# Patient Record
Sex: FEMALE
[De-identification: names, ages, dates, MRNs, and addresses within clinical notes are randomized per-mention and may not be internally consistent; named-entity substitution may affect disease eponyms.]

---

## 2021-04-11 ENCOUNTER — NURSE TRIAGE (OUTPATIENT)
Dept: OTHER | Facility: CLINIC | Age: 41
End: 2021-04-11

## 2021-04-11 NOTE — TELEPHONE ENCOUNTER
Reason for Disposition   General information question, no triage required and triager able to answer question    Answer Assessment - Initial Assessment Questions  1. REASON FOR CALL or QUESTION: \"What is your reason for calling today? \" or \"How can I best help you? \" or \"What question do you have that I can help answer? \"      Pt called to inquire as to if her  should return to work while waiting on Covid test result (she tested positive last week, and he is symptomatic with diarrhea currently). Protocols used: INFORMATION ONLY CALL - NO TRIAGE-ADULT-    Brief description of triage: No triage. Information only. Pt given advice per the CDC (CDC.gov/covid19):    Isolate If You Are Sick  Separate yourself from others if you have COVID-19    Updated Feb. 18, 2021  Print  Isolation is used to separate people infected with COVID-19 from those who are not infected. People who are in isolation should stay home until its safe for them to be around others. At home, anyone sick or infected should separate from others, stay in a specific sick room or area, and use a separate bathroom (if available). Isolation or Quarantine: What's the difference? Lauren Sutherland keeps someone who might have been exposed to the virus away from others. Isolation keeps someone who is infected with the virus away from others, even in their home. Who needs to isolate? People who have COVID-19    People who have symptoms of COVID-19 and are able to recover at home  People who dont have symptoms but have tested positive for COVID-19?? Steps to take    Stay home except to get medical care    Monitor your symptoms.  If you have an emergency warning sign (including trouble breathing), seek emergency medical care immediately  Stay in a separate room from other household members, if possible  Use a separate bathroom, if possible  Avoid contact with other members of the household and pets  Dont share personal household items, like cups, towels, and utensils  Wear a mask when around other people, if you are able to  Learn more about what to do if you are sick and how to notify your contacts. When you can be around others after you had or likely had COVID-19  When you can be around others (end home isolation) depends on different factors for different situations. Find CDCs recommendations for your situation below. I think or know I had COVID-19, and I had symptoms  You can be with others after    At least 10 days since symptoms first appeared and  At least 24 hours with no fever without fever-reducing medication and  Other symptoms of COVID-19 are improving  **Loss of taste and smell may persist for weeks or months after recovery and need not delay the end of isolation? If you had severe illness from COVID-19 (you were admitted to a hospital and needed oxygen), your healthcare provider may recommend that you stay in isolation for longer than 10 days after your symptoms first appeared (possibly up to 20 days) and you may need to finish your period of isolation at home. Unable to route encounter    Care advice provided, patient verbalizes understanding; denies any other questions or concerns; instructed to call back for any new or worsening symptoms. This triage is a result of a call to [address] [place] [address]. Please do not respond to the triage nurse through this encounter. Any subsequent communication should be directly with the patient.